# Patient Record
Sex: MALE | Race: WHITE
[De-identification: names, ages, dates, MRNs, and addresses within clinical notes are randomized per-mention and may not be internally consistent; named-entity substitution may affect disease eponyms.]

---

## 2018-11-09 ENCOUNTER — APPOINTMENT (OUTPATIENT)
Dept: FAMILY MEDICINE | Facility: CLINIC | Age: 68
End: 2018-11-09
Payer: COMMERCIAL

## 2018-11-09 VITALS
WEIGHT: 160 LBS | BODY MASS INDEX: 24.25 KG/M2 | HEIGHT: 68 IN | SYSTOLIC BLOOD PRESSURE: 112 MMHG | DIASTOLIC BLOOD PRESSURE: 68 MMHG

## 2018-11-09 DIAGNOSIS — N40.0 BENIGN PROSTATIC HYPERPLASIA WITHOUT LOWER URINARY TRACT SYMPMS: ICD-10-CM

## 2018-11-09 DIAGNOSIS — Z86.39 PERSONAL HISTORY OF OTHER ENDOCRINE, NUTRITIONAL AND METABOLIC DISEASE: ICD-10-CM

## 2018-11-09 DIAGNOSIS — E55.9 VITAMIN D DEFICIENCY, UNSPECIFIED: ICD-10-CM

## 2018-11-09 DIAGNOSIS — Z71.89 OTHER SPECIFIED COUNSELING: ICD-10-CM

## 2018-11-09 DIAGNOSIS — Z83.3 FAMILY HISTORY OF DIABETES MELLITUS: ICD-10-CM

## 2018-11-09 DIAGNOSIS — C44.92 SQUAMOUS CELL CARCINOMA OF SKIN, UNSPECIFIED: ICD-10-CM

## 2018-11-09 PROCEDURE — 90472 IMMUNIZATION ADMIN EACH ADD: CPT

## 2018-11-09 PROCEDURE — 90691 TYPHOID VACCINE IM: CPT

## 2018-11-09 PROCEDURE — 90632 HEPA VACCINE ADULT IM: CPT

## 2018-11-09 PROCEDURE — 99203 OFFICE O/P NEW LOW 30 MIN: CPT | Mod: 25

## 2018-11-09 PROCEDURE — 90471 IMMUNIZATION ADMIN: CPT

## 2018-11-09 RX ORDER — FINASTERIDE 5 MG/1
5 TABLET, FILM COATED ORAL DAILY
Refills: 0 | Status: ACTIVE | COMMUNITY

## 2018-11-09 RX ORDER — ATORVASTATIN CALCIUM 20 MG/1
20 TABLET, FILM COATED ORAL
Refills: 0 | Status: ACTIVE | COMMUNITY

## 2021-10-27 ENCOUNTER — TRANSCRIPTION ENCOUNTER (OUTPATIENT)
Age: 71
End: 2021-10-27

## 2022-08-13 ENCOUNTER — EMERGENCY (EMERGENCY)
Facility: HOSPITAL | Age: 72
LOS: 0 days | Discharge: HOME | End: 2022-08-13
Attending: EMERGENCY MEDICINE
Payer: MEDICARE

## 2022-08-13 VITALS
HEART RATE: 72 BPM | OXYGEN SATURATION: 100 % | DIASTOLIC BLOOD PRESSURE: 75 MMHG | RESPIRATION RATE: 17 BRPM | SYSTOLIC BLOOD PRESSURE: 125 MMHG | TEMPERATURE: 98 F
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
## 2022-08-13 DIAGNOSIS — L03.116 CELLULITIS OF LEFT LOWER LIMB: ICD-10-CM
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
## 2022-08-13 DIAGNOSIS — S80.812A ABRASION, LEFT LOWER LEG, INITIAL ENCOUNTER: ICD-10-CM

## 2022-08-13 PROCEDURE — 99283 EMERGENCY DEPT VISIT LOW MDM: CPT

## 2022-08-13 RX ADMIN — Medication 100 MILLIGRAM(S): at 20:52

## 2022-08-13 NOTE — ED STATDOCS - PHYSICAL EXAMINATION
Gen: Well appearing in NAD  Head: NC/AT  Neck: trachea midline  Resp:  No distress  Ext: lateral shin w/ scabbed lesion w/ 1cm surrounding erythema.   Neuro:  A&O appears non focal  Skin:  Warm and dry as visualized  Psych:  Normal affect and mood

## 2022-08-13 NOTE — ED STATDOCS - CLINICAL SUMMARY MEDICAL DECISION MAKING FREE TEXT BOX
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

## 2022-08-13 NOTE — ED STATDOCS - OBJECTIVE STATEMENT
72yo m denies PMH presents w/ left shin insect bite x 1 wk w/ new redness. Pt was unclear if insect bite however thinks there was a bite there and how has a scab w/ new redness. no fevers. no known MRSA hx.

## 2022-08-13 NOTE — ED STATDOCS - NS ED ROS FT
Gen: No fever, normal appetite  Eyes: No eye irritation or discharge  ENT: No ear pain, congestion, sore throat  Resp: No cough or trouble breathing  Cardiovascular: No chest pain or palpitation  Gastroenteric: No nausea/vomiting, diarrhea, constipation  :  No change in urine output; no dysuria  MS: No joint or muscle pain  Skin: redness swelling   Neuro: No headache; no abnormal movements  Remainder negative, except as per the HPI

## 2022-08-13 NOTE — ED STATDOCS - NSFOLLOWUPINSTRUCTIONS_ED_ALL_ED_FT
Cellulitis is a skin infection. The infected area is usually warm, red, swollen, and tender. This condition occurs most often in the arms and lower legs. The infection can travel to the muscles, blood, and underlying tissue and become serious. It is very important to get treated for this condition.      What are the causes?    Cellulitis is caused by bacteria. The bacteria enter through a break in the skin, such as a cut, burn, insect bite, open sore, or crack.      What increases the risk?    This condition is more likely to occur in people who:  •Have a weak body defense system (immune system).      •Have open wounds on the skin, such as cuts, burns, bites, and scrapes. Bacteria can enter the body through these open wounds.      •Are older than 60 years of age.      •Have diabetes.      •Have a type of long-lasting (chronic) liver disease (cirrhosis) or kidney disease.      •Are obese.    •Have a skin condition such as:  •Itchy rash (eczema).      •Slow movement of blood in the veins (venous stasis).      •Fluid buildup below the skin (edema).        •Have had radiation therapy.      •Use IV drugs.        What are the signs or symptoms?    Symptoms of this condition include:  •Redness, streaking, or spotting on the skin.      •Swollen area of the skin.      •Tenderness or pain when an area of the skin is touched.      •Warm skin.      •A fever.      •Chills.      •Blisters.        How is this diagnosed?    This condition is diagnosed based on a medical history and physical exam. You may also have tests, including:  •Blood tests.      •Imaging tests.        How is this treated?    Treatment for this condition may include:  •Medicines, such as antibiotic medicines or medicines to treat allergies (antihistamines).      •Supportive care, such as rest and application of cold or warm cloths (compresses) to the skin.      •Hospital care, if the condition is severe.      The infection usually starts to get better within 1–2 days of treatment.      Follow these instructions at home:     Medicines     •Take over-the-counter and prescription medicines only as told by your health care provider.      •If you were prescribed an antibiotic medicine, take it as told by your health care provider. Do not stop taking the antibiotic even if you start to feel better.      General instructions     •Drink enough fluid to keep your urine pale yellow.      • Do not touch or rub the infected area.      •Raise (elevate) the infected area above the level of your heart while you are sitting or lying down.      •Apply warm or cold compresses to the affected area as told by your health care provider.      •Keep all follow-up visits as told by your health care provider. This is important. These visits let your health care provider make sure a more serious infection is not developing.        Contact a health care provider if:    •You have a fever.      •Your symptoms do not begin to improve within 1–2 days of starting treatment.      •Your bone or joint underneath the infected area becomes painful after the skin has healed.      •Your infection returns in the same area or another area.      •You notice a swollen bump in the infected area.      •You develop new symptoms.      •You have a general ill feeling (malaise) with muscle aches and pains.        Get help right away if:    •Your symptoms get worse.      •You feel very sleepy.      •You develop vomiting or diarrhea that persists.      •You notice red streaks coming from the infected area.      •Your red area gets larger or turns dark in color.      These symptoms may represent a serious problem that is an emergency. Do not wait to see if the symptoms will go away. Get medical help right away. Call your local emergency services (911 in the [place] Do not drive yourself to the hospital.       Summary    •Cellulitis is a skin infection. This condition occurs most often in the arms and lower legs.      •Treatment for this condition may include medicines, such as antibiotic medicines or antihistamines.      •Take over-the-counter and prescription medicines only as told by your health care provider. If you were prescribed an antibiotic medicine, do not stop taking the antibiotic even if you start to feel better.      •Contact a health care provider if your symptoms do not begin to improve within 1–2 days of starting treatment or your symptoms get worse.      •Keep all follow-up visits as told by your health care provider. This is important. These visits let your health care provider make sure that a more serious infection is not developing.      This information is not intended to replace advice given to you by your health care provider. Make sure you discuss any questions you have with your health care provider.      Document Revised: 12/28/2020 Document Reviewed: 05/09/2019    [employer]vier Patient Education © 2022 [employer]

## 2022-08-13 NOTE — ED ADULT TRIAGE NOTE - CHIEF COMPLAINT QUOTE
pt c/o left calf pain. sore noted to left calf white center with reddened edges. pt denies fevers and chills at home. endorses itching. no signs of distress noted. pt ambulatory